# Patient Record
Sex: FEMALE | Race: WHITE | Employment: UNEMPLOYED | URBAN - METROPOLITAN AREA
[De-identification: names, ages, dates, MRNs, and addresses within clinical notes are randomized per-mention and may not be internally consistent; named-entity substitution may affect disease eponyms.]

---

## 2021-12-10 ENCOUNTER — LAB ENCOUNTER (OUTPATIENT)
Dept: LAB | Age: 56
End: 2021-12-10
Attending: INTERNAL MEDICINE
Payer: COMMERCIAL

## 2021-12-10 DIAGNOSIS — Z13.220 ENCOUNTER FOR SCREENING FOR LIPID DISORDER: ICD-10-CM

## 2021-12-10 DIAGNOSIS — Z13.29 ENCOUNTER FOR SCREENING FOR ENDOCRINE DISORDER: ICD-10-CM

## 2021-12-10 DIAGNOSIS — Z13.0 SCREENING FOR DISORDER OF BLOOD AND BLOOD-FORMING ORGANS: ICD-10-CM

## 2021-12-10 DIAGNOSIS — Z13.228 ENCOUNTER FOR SCREENING FOR METABOLIC DISORDER: ICD-10-CM

## 2021-12-10 PROCEDURE — 80061 LIPID PANEL: CPT

## 2021-12-10 PROCEDURE — 84443 ASSAY THYROID STIM HORMONE: CPT

## 2021-12-10 PROCEDURE — 80053 COMPREHEN METABOLIC PANEL: CPT

## 2021-12-10 PROCEDURE — 85025 COMPLETE CBC W/AUTO DIFF WBC: CPT

## 2023-06-08 ENCOUNTER — EMPLOYEE HEALTH (OUTPATIENT)
Dept: OTHER | Facility: HOSPITAL | Age: 58
End: 2023-06-08
Attending: PREVENTIVE MEDICINE

## 2023-06-08 DIAGNOSIS — Z00.00 WELLNESS EXAMINATION: Primary | ICD-10-CM

## 2023-06-08 PROCEDURE — 86480 TB TEST CELL IMMUN MEASURE: CPT

## 2023-06-08 PROCEDURE — 86762 RUBELLA ANTIBODY: CPT

## 2023-06-08 PROCEDURE — 86735 MUMPS ANTIBODY: CPT

## 2023-06-08 PROCEDURE — 86787 VARICELLA-ZOSTER ANTIBODY: CPT

## 2023-06-08 PROCEDURE — 86765 RUBEOLA ANTIBODY: CPT

## 2023-06-09 LAB
MEV IGG SER-ACNC: 16.4 AU/ML (ref 16.5–?)
MUV IGG SER IA-ACNC: <5 AU/ML (ref 11–?)
RUBV IGG SER QL: NEGATIVE
RUBV IGG SER-ACNC: 0.5 IU/ML (ref 10–?)
VZV IGG SER IA-ACNC: 3092 (ref 165–?)

## 2023-06-12 LAB
M TB IFN-G CD4+ T-CELLS BLD-ACNC: 0.03 IU/ML
M TB TUBERC IFN-G BLD QL: NEGATIVE
M TB TUBERC IGNF/MITOGEN IGNF CONTROL: >10 IU/ML
QFT TB1 AG MINUS NIL: 0.01 IU/ML
QFT TB2 AG MINUS NIL: 0 IU/ML

## 2024-04-22 ENCOUNTER — HOSPITAL ENCOUNTER (OUTPATIENT)
Age: 59
Discharge: HOME | End: 2024-04-22
Attending: EMERGENCY MEDICINE
Payer: COMMERCIAL

## 2024-04-22 VITALS
DIASTOLIC BLOOD PRESSURE: 99 MMHG | WEIGHT: 155 LBS | TEMPERATURE: 98 F | HEART RATE: 81 BPM | OXYGEN SATURATION: 100 % | RESPIRATION RATE: 18 BRPM | BODY MASS INDEX: 27 KG/M2 | SYSTOLIC BLOOD PRESSURE: 184 MMHG

## 2024-04-22 DIAGNOSIS — I10 ELEVATED BLOOD PRESSURE READING WITH DIAGNOSIS OF HYPERTENSION: ICD-10-CM

## 2024-04-22 DIAGNOSIS — H72.92 PERFORATION OF LEFT TYMPANIC MEMBRANE: ICD-10-CM

## 2024-04-22 DIAGNOSIS — H66.90 ACUTE OTITIS MEDIA, UNSPECIFIED OTITIS MEDIA TYPE: Primary | ICD-10-CM

## 2024-04-22 PROCEDURE — 99213 OFFICE O/P EST LOW 20 MIN: CPT

## 2024-04-22 PROCEDURE — 99203 OFFICE O/P NEW LOW 30 MIN: CPT

## 2024-04-22 RX ORDER — CIPROFLOXACIN AND DEXAMETHASONE 3; 1 MG/ML; MG/ML
4 SUSPENSION/ DROPS AURICULAR (OTIC) 2 TIMES DAILY
Qty: 7.5 ML | Refills: 0 | Status: SHIPPED | OUTPATIENT
Start: 2024-04-22 | End: 2024-04-29

## 2024-04-22 RX ORDER — AMOXICILLIN 500 MG/1
500 TABLET, FILM COATED ORAL 2 TIMES DAILY
Qty: 20 TABLET | Refills: 0 | Status: SHIPPED | OUTPATIENT
Start: 2024-04-22 | End: 2024-05-02

## 2024-04-22 RX ORDER — AMLODIPINE BESYLATE 5 MG/1
5 TABLET ORAL DAILY
Qty: 30 TABLET | Refills: 0 | Status: SHIPPED | OUTPATIENT
Start: 2024-04-22 | End: 2024-05-22

## 2024-04-22 NOTE — ED PROVIDER NOTES
Patient Seen in: [facility]      History     Chief Complaint   Patient presents with    Ear Pain    Nasal Congestion     Stated Complaint: congestion, ear pain    Subjective:   8-year-old female, history hypertension, not currently on her amlodipine would like a refill, presents with left ear pain.  Was at her daughter's college a week ago, states people viral illness but had some left ear fullness starting on Thursday got worse, concerned that she may have perforated panic membrane.  No fevers.  With sinus congestion and dry cough.  Not short of breath.  No difficulty swallowing.  No chest pain pleurisy or hemoptysis.            Objective:   Past Medical History:    Essential hypertension    Non Hodgkin's lymphoma (HCC)              History reviewed. No pertinent surgical history.             Social History     Socioeconomic History    Marital status:    Tobacco Use    Smoking status: Never    Smokeless tobacco: Never   Vaping Use    Vaping status: Never Used   Substance and Sexual Activity    Alcohol use: Yes     Comment: Rarely     Drug use: Never     Social Determinants of Health     Physical Activity: Insufficiently Active (11/16/2021)    Exercise Vital Sign     Days of Exercise per Week: 3 days     Minutes of Exercise per Session: 40 min              Review of Systems   Constitutional:  Negative for fever.   HENT:  Positive for congestion, ear discharge and ear pain.    Cardiovascular:  Negative for chest pain.   Gastrointestinal:  Negative for abdominal pain.   Neurological:  Negative for dizziness and headaches.       Positive for stated complaint: congestion, ear pain  Other systems are as noted in HPI.  Constitutional and vital signs reviewed.      All other systems reviewed and negative except as noted above.    Physical Exam     ED Triage Vitals [04/22/24 0956]   BP (!) 184/99   Pulse 81   Resp 18   Temp 97.6 °F (36.4 °C)   Temp src Temporal   SpO2 100 %   O2 Device None (Room  air)       Current:BP (!) 184/99   Pulse 81   Temp 97.6 °F (36.4 °C) (Temporal)   Resp 18   Wt 70.3 kg   SpO2 100%   BMI 27.03 kg/m²         Physical Exam  Vitals and nursing note reviewed.   Constitutional:       Appearance: She is not toxic-appearing.   HENT:      Head: Normocephalic.      Right Ear: Tympanic membrane normal.      Ears:      Comments: Left TM retracted dull and erythematous.  The small perforation in the anterior aspect elevated.  Eyes:      Extraocular Movements: Extraocular movements intact.   Cardiovascular:      Rate and Rhythm: Normal rate and regular rhythm.   Pulmonary:      Effort: Pulmonary effort is normal. No respiratory distress.      Breath sounds: Normal breath sounds. No stridor. No wheezing, rhonchi or rales.   Musculoskeletal:         General: Normal range of motion.      Cervical back: Neck supple.   Neurological:      General: No focal deficit present.      Mental Status: She is alert and oriented to person, place, and time.      Cranial Nerves: No cranial nerve deficit.   Psychiatric:         Mood and Affect: Mood normal.         Behavior: Behavior normal.               ED Course   Labs Reviewed - No data to display                   MDM     58-year-old female with otitis media likely causing a perforated traumatic membrane and some slight otitis externa.  Ciprodex drops and oral amoxicillin.  Also refilled her amlodipine at her request.  5 mg daily.  Will follow-up with PCP and keep a log of her blood pressure.  States he is always high in the office but monitors at home and she is usually 130s to 140s systolic.  States her 180s over 90s is not uncommon due to her whitecoat syndrome.  Discharge home at her request.  Normal neurological examination.  No tinnitus.  Outpatient follow-up, return precaution provided, shared decision made utilized, discharged home at her request      Patient was screened and evaluated during this visit.  As the treating physician attending to  the patient, I determined within reasonable clinical confidence and prior to discharge, that an emergency medical condition was not or was no longer present.  There was no indication for further evaluation, treatment, or admission on an emergency basis.  Comprehensive verbal and written discharge and follow-up instructions were provided to help prevent relapse or worsening.  Patient was instructed to follow-up with their primary care provider for further evaluation and treatment, return immediately to ER for worsening, concerning, new, or changing/persisting symptoms. I discussed the case with the patient and they had no questions, complaints, or concerns.  Patient was comfortable going home.     Per the discharge paperwork, patients are encouraged to and given instructions on how to sign [facility] for [facility], where they have access to their records, including any/all incidental findings.     This note was prepared using Dragon Medical voice recognition dictation software. As a result errors may occur. When identified these errors have been corrected. While every attempt is made to correct errors during dictation discrepancies may still exist    Note to patient: The 21st Century Cures Act makes medical notes like these available to patients in the interest of transparency. However, this is a medical document intended as peer to peer communication. It is written in medical language and may contain abbreviations or verbiage that are unfamiliar. It may appear blunt or direct. Medical documents are intended to carry relevant information, facts as evident, and the clinical opinion of the practitioner.                                Medical Decision Making      Disposition and Plan     Clinical Impression:  1. Acute otitis media, unspecified otitis media type    2. Perforation of left tympanic membrane    3. Elevated blood pressure reading with diagnosis of hypertension         Disposition:  Discharge  4/22/2024 10:12  am    Follow-up:  [facility]  [address]  [facility] [address]  [phone]    As needed          Medications Prescribed:  Current Discharge Medication List        START taking these medications    Details   amoxicillin 500 MG Oral Tab Take 1 tablet (500 mg total) by mouth 2 (two) times daily for 10 days.  Qty: 20 tablet, Refills: 0      ciprofloxacin-dexamethasone 0.3-0.1 % Otic Suspension Place 4 drops into the left ear 2 (two) times daily for 7 days.  Qty: 7.5 mL, Refills: 0      !! amLODIPine 5 MG Oral Tab Take 1 tablet (5 mg total) by mouth daily.  Qty: 30 tablet, Refills: 0       !! - Potential duplicate medications found. Please discuss with provider.

## 2024-04-22 NOTE — ED INITIAL ASSESSMENT (HPI)
C/o onset head and nasal congestion since Thursday. Left ear clogged since Saturday, sometime with minimal pain.

## 2024-04-22 NOTE — DISCHARGE INSTRUCTIONS
Please follow-up with your primary care physician in 1 to 2 days.  Please return to the ER with any worsening symptoms or concerns at any time for any reason.  Please call with any questions